# Patient Record
Sex: FEMALE | Race: WHITE | NOT HISPANIC OR LATINO | Employment: OTHER | URBAN - METROPOLITAN AREA
[De-identification: names, ages, dates, MRNs, and addresses within clinical notes are randomized per-mention and may not be internally consistent; named-entity substitution may affect disease eponyms.]

---

## 2017-11-07 DIAGNOSIS — Z01.812 PRE-OPERATIVE LABORATORY EXAMINATION: ICD-10-CM

## 2017-11-07 LAB
BASOPHILS # BLD AUTO: 0.6 % (ref 0–1.8)
BASOPHILS # BLD: 0.04 K/UL (ref 0–0.12)
EOSINOPHIL # BLD AUTO: 0.03 K/UL (ref 0–0.51)
EOSINOPHIL NFR BLD: 0.5 % (ref 0–6.9)
ERYTHROCYTE [DISTWIDTH] IN BLOOD BY AUTOMATED COUNT: 44.4 FL (ref 35.9–50)
HCT VFR BLD AUTO: 45.6 % (ref 37–47)
HGB BLD-MCNC: 15.4 G/DL (ref 12–16)
IMM GRANULOCYTES # BLD AUTO: 0.02 K/UL (ref 0–0.11)
IMM GRANULOCYTES NFR BLD AUTO: 0.3 % (ref 0–0.9)
LYMPHOCYTES # BLD AUTO: 1.54 K/UL (ref 1–4.8)
LYMPHOCYTES NFR BLD: 24.1 % (ref 22–41)
MCH RBC QN AUTO: 33 PG (ref 27–33)
MCHC RBC AUTO-ENTMCNC: 33.8 G/DL (ref 33.6–35)
MCV RBC AUTO: 97.9 FL (ref 81.4–97.8)
MONOCYTES # BLD AUTO: 0.43 K/UL (ref 0–0.85)
MONOCYTES NFR BLD AUTO: 6.7 % (ref 0–13.4)
NEUTROPHILS # BLD AUTO: 4.32 K/UL (ref 2–7.15)
NEUTROPHILS NFR BLD: 67.8 % (ref 44–72)
NRBC # BLD AUTO: 0 K/UL
NRBC BLD AUTO-RTO: 0 /100 WBC
PLATELET # BLD AUTO: 266 K/UL (ref 164–446)
PMV BLD AUTO: 10.4 FL (ref 9–12.9)
RBC # BLD AUTO: 4.66 M/UL (ref 4.2–5.4)
WBC # BLD AUTO: 6.4 K/UL (ref 4.8–10.8)

## 2017-11-07 PROCEDURE — 36415 COLL VENOUS BLD VENIPUNCTURE: CPT

## 2017-11-07 PROCEDURE — 85025 COMPLETE CBC W/AUTO DIFF WBC: CPT

## 2017-11-07 PROCEDURE — 80053 COMPREHEN METABOLIC PANEL: CPT

## 2017-11-07 RX ORDER — FLUOXETINE 10 MG/1
10 CAPSULE ORAL DAILY
COMMUNITY

## 2017-11-07 RX ORDER — SUMATRIPTAN 100 MG/1
100 TABLET, FILM COATED ORAL
COMMUNITY

## 2017-11-08 LAB
ALBUMIN SERPL BCP-MCNC: 4.7 G/DL (ref 3.2–4.9)
ALBUMIN/GLOB SERPL: 1.9 G/DL
ALP SERPL-CCNC: 84 U/L (ref 30–99)
ALT SERPL-CCNC: 18 U/L (ref 2–50)
ANION GAP SERPL CALC-SCNC: 13 MMOL/L (ref 0–11.9)
AST SERPL-CCNC: 22 U/L (ref 12–45)
BILIRUB SERPL-MCNC: 0.3 MG/DL (ref 0.1–1.5)
BUN SERPL-MCNC: 22 MG/DL (ref 8–22)
CALCIUM SERPL-MCNC: 9.7 MG/DL (ref 8.5–10.5)
CHLORIDE SERPL-SCNC: 105 MMOL/L (ref 96–112)
CO2 SERPL-SCNC: 23 MMOL/L (ref 20–33)
CREAT SERPL-MCNC: 0.67 MG/DL (ref 0.5–1.4)
GFR SERPL CREATININE-BSD FRML MDRD: >60 ML/MIN/1.73 M 2
GLOBULIN SER CALC-MCNC: 2.5 G/DL (ref 1.9–3.5)
GLUCOSE SERPL-MCNC: 84 MG/DL (ref 65–99)
POTASSIUM SERPL-SCNC: 3.8 MMOL/L (ref 3.6–5.5)
PROT SERPL-MCNC: 7.2 G/DL (ref 6–8.2)
SODIUM SERPL-SCNC: 141 MMOL/L (ref 135–145)

## 2017-11-20 ENCOUNTER — HOSPITAL ENCOUNTER (OUTPATIENT)
Facility: MEDICAL CENTER | Age: 67
End: 2017-11-20
Attending: SPECIALIST | Admitting: SPECIALIST
Payer: COMMERCIAL

## 2017-11-20 VITALS
OXYGEN SATURATION: 96 % | TEMPERATURE: 98.2 F | RESPIRATION RATE: 16 BRPM | SYSTOLIC BLOOD PRESSURE: 129 MMHG | HEART RATE: 92 BPM | DIASTOLIC BLOOD PRESSURE: 63 MMHG | WEIGHT: 153.88 LBS | BODY MASS INDEX: 27.27 KG/M2 | HEIGHT: 63 IN

## 2017-11-20 PROCEDURE — 700101 HCHG RX REV CODE 250

## 2017-11-20 PROCEDURE — 160002 HCHG RECOVERY MINUTES (STAT): Performed by: SPECIALIST

## 2017-11-20 PROCEDURE — A9270 NON-COVERED ITEM OR SERVICE: HCPCS

## 2017-11-20 PROCEDURE — 500368 HCHG DRAIN, 7MM FLAT-FLUTED: Performed by: SPECIALIST

## 2017-11-20 PROCEDURE — 160025 RECOVERY II MINUTES (STATS): Performed by: SPECIALIST

## 2017-11-20 PROCEDURE — 700102 HCHG RX REV CODE 250 W/ 637 OVERRIDE(OP)

## 2017-11-20 PROCEDURE — 501480 HCHG STOCKINETTE: Performed by: SPECIALIST

## 2017-11-20 PROCEDURE — 500440 HCHG DRESSING, STERILE ROLL (KERLIX): Performed by: SPECIALIST

## 2017-11-20 PROCEDURE — 160041 HCHG SURGERY MINUTES - EA ADDL 1 MIN LEVEL 4: Performed by: SPECIALIST

## 2017-11-20 PROCEDURE — A4311 CATHETER W/O BAG 2-WAY LATEX: HCPCS | Performed by: SPECIALIST

## 2017-11-20 PROCEDURE — 700111 HCHG RX REV CODE 636 W/ 250 OVERRIDE (IP)

## 2017-11-20 PROCEDURE — 500389 HCHG DRAIN, RESERVOIR SUCT JP 100CC: Performed by: SPECIALIST

## 2017-11-20 PROCEDURE — 160029 HCHG SURGERY MINUTES - 1ST 30 MINS LEVEL 4: Performed by: SPECIALIST

## 2017-11-20 PROCEDURE — 500754 HCHG JAW BRA: Performed by: SPECIALIST

## 2017-11-20 PROCEDURE — 302135 SEQUENTIAL COMPRESSION MACHINE: Performed by: SPECIALIST

## 2017-11-20 PROCEDURE — 501445 HCHG STAPLER, SKIN DISP: Performed by: SPECIALIST

## 2017-11-20 PROCEDURE — 160009 HCHG ANES TIME/MIN: Performed by: SPECIALIST

## 2017-11-20 PROCEDURE — 160046 HCHG PACU - 1ST 60 MINS PHASE II: Performed by: SPECIALIST

## 2017-11-20 PROCEDURE — 500126 HCHG BOVIE, NEEDLE TIP: Performed by: SPECIALIST

## 2017-11-20 PROCEDURE — A6410 STERILE EYE PAD: HCPCS | Performed by: SPECIALIST

## 2017-11-20 PROCEDURE — 160036 HCHG PACU - EA ADDL 30 MINS PHASE I: Performed by: SPECIALIST

## 2017-11-20 PROCEDURE — 160048 HCHG OR STATISTICAL LEVEL 1-5: Performed by: SPECIALIST

## 2017-11-20 PROCEDURE — 160035 HCHG PACU - 1ST 60 MINS PHASE I: Performed by: SPECIALIST

## 2017-11-20 PROCEDURE — 501838 HCHG SUTURE GENERAL: Performed by: SPECIALIST

## 2017-11-20 PROCEDURE — 160047 HCHG PACU  - EA ADDL 30 MINS PHASE II: Performed by: SPECIALIST

## 2017-11-20 RX ORDER — HALOPERIDOL 5 MG/ML
INJECTION INTRAMUSCULAR
Status: COMPLETED
Start: 2017-11-20 | End: 2017-11-20

## 2017-11-20 RX ORDER — SCOLOPAMINE TRANSDERMAL SYSTEM 1 MG/1
PATCH, EXTENDED RELEASE TRANSDERMAL
Status: COMPLETED
Start: 2017-11-20 | End: 2017-11-20

## 2017-11-20 RX ORDER — SODIUM CHLORIDE, SODIUM LACTATE, POTASSIUM CHLORIDE, CALCIUM CHLORIDE 600; 310; 30; 20 MG/100ML; MG/100ML; MG/100ML; MG/100ML
INJECTION, SOLUTION INTRAVENOUS
Status: ACTIVE | OUTPATIENT
Start: 2017-11-20 | End: 2017-11-20

## 2017-11-20 RX ORDER — BALANCED SALT SOLUTION 6.4; .75; .48; .3; 3.9; 1.7 MG/ML; MG/ML; MG/ML; MG/ML; MG/ML; MG/ML
SOLUTION OPHTHALMIC
Status: DISCONTINUED | OUTPATIENT
Start: 2017-11-20 | End: 2017-11-20 | Stop reason: HOSPADM

## 2017-11-20 RX ORDER — BACITRACIN ZINC 500 [USP'U]/G
OINTMENT TOPICAL
Status: DISCONTINUED | OUTPATIENT
Start: 2017-11-20 | End: 2017-11-20 | Stop reason: HOSPADM

## 2017-11-20 RX ORDER — LIDOCAINE HYDROCHLORIDE AND EPINEPHRINE 5; 5 MG/ML; UG/ML
INJECTION, SOLUTION INFILTRATION; PERINEURAL
Status: DISCONTINUED | OUTPATIENT
Start: 2017-11-20 | End: 2017-11-20 | Stop reason: HOSPADM

## 2017-11-20 RX ORDER — LIDOCAINE HYDROCHLORIDE AND EPINEPHRINE 10; 10 MG/ML; UG/ML
INJECTION, SOLUTION INFILTRATION; PERINEURAL
Status: DISCONTINUED | OUTPATIENT
Start: 2017-11-20 | End: 2017-11-20 | Stop reason: HOSPADM

## 2017-11-20 RX ORDER — SODIUM CHLORIDE, SODIUM LACTATE, POTASSIUM CHLORIDE, CALCIUM CHLORIDE 600; 310; 30; 20 MG/100ML; MG/100ML; MG/100ML; MG/100ML
1000 INJECTION, SOLUTION INTRAVENOUS
Status: DISCONTINUED | OUTPATIENT
Start: 2017-11-20 | End: 2017-11-20

## 2017-11-20 RX ORDER — EPINEPHRINE 1 MG/ML(1)
AMPUL (ML) INJECTION
Status: DISCONTINUED | OUTPATIENT
Start: 2017-11-20 | End: 2017-11-20 | Stop reason: HOSPADM

## 2017-11-20 RX ORDER — LIDOCAINE HYDROCHLORIDE 10 MG/ML
INJECTION, SOLUTION INFILTRATION; PERINEURAL
Status: DISCONTINUED | OUTPATIENT
Start: 2017-11-20 | End: 2017-11-20 | Stop reason: HOSPADM

## 2017-11-20 RX ADMIN — EPHEDRINE SULFATE 25 MG: 50 INJECTION INTRAMUSCULAR; INTRAVENOUS; SUBCUTANEOUS at 18:05

## 2017-11-20 RX ADMIN — SODIUM CHLORIDE, SODIUM LACTATE, POTASSIUM CHLORIDE, CALCIUM CHLORIDE 1000 ML: 600; 310; 30; 20 INJECTION, SOLUTION INTRAVENOUS at 09:30

## 2017-11-20 RX ADMIN — FENTANYL CITRATE 25 MCG: 50 INJECTION, SOLUTION INTRAMUSCULAR; INTRAVENOUS at 16:53

## 2017-11-20 RX ADMIN — HALOPERIDOL LACTATE 1 MG: 5 INJECTION, SOLUTION INTRAMUSCULAR at 16:26

## 2017-11-20 RX ADMIN — SCOPALAMINE 1 PATCH: 1 PATCH, EXTENDED RELEASE TRANSDERMAL at 09:30

## 2017-11-20 ASSESSMENT — PAIN SCALES - GENERAL
PAINLEVEL_OUTOF10: 4
PAINLEVEL_OUTOF10: ASSUMED PAIN PRESENT
PAINLEVEL_OUTOF10: 4
PAINLEVEL_OUTOF10: 0
PAINLEVEL_OUTOF10: 4
PAINLEVEL_OUTOF10: 0
PAINLEVEL_OUTOF10: 4
PAINLEVEL_OUTOF10: ASSUMED PAIN PRESENT
PAINLEVEL_OUTOF10: 0
PAINLEVEL_OUTOF10: 0

## 2017-11-20 NOTE — OR NURSING
1522 To PACU from OR via rney, side rails up x 2 for safety, lungs clear bilaterally, scds on patient and machine operational, dressing around head with ice packs to jaw bra and assessed bilateral eyes, steristrips CDI to lateral and medial eyes with minimal bruising or swelling, ice pack over NS soaked eye guaze pads. HOB elevated at 30 degrees. Moderate serosanguinous dilute drainage noted to posterior head dressing. Small, serosanguinous drainage to bilateral JAME drains. Mild rhythmic snoring noted. Pt does not respond to voice or touch.   1535 No changes  1550 Pt rousing with upper extremity movement. Pt able to nod yes and no; pt denies pain or nausea. Agrees to being comfortable.   1605 Pt arouses to voice, able to respond verbally in a few words, denies pain and nausea. Reports being sleepy. Denies any needs.   1620 Pt reports moderate nausea; sweaty from matthew hugger robe. RN removes robe while keeping patient covered in sheet for modesty; pt aware. Encouraged to DB/C; demonstrated understanding. Plan to give Haldol IV for nausea comfort.   1635 Pt reports nausea improved and tolerating ice chip. Denies pain. Eye pads removed and pt able to open eyes and reports vision as baseline. Minimal bruising and swelling. Eye pads and ice pack replaced over eyes.   1650 Reports pain of 5/10 to chin area, denies otherwise. Denies nausea; tolerating ice chips/sips. Instructed to DB/C; demonstrated understanding. Discussed oral PRN pain medication; pt declines oral medication due to hx of nausea, will eat at hotel and take medication then.   1705 Pt reports pain as improved to 4/10 and tolerable. Denies nausea.   1710 meets criteria for transfer to stage II.

## 2017-11-21 NOTE — OR NURSING
Patient's eyes bruised and swollen, she can see how many fingers I hold up. Patient and her  verbalize good understanding of discharge instructions. , Alessandro, demonstrates emptying JAME drains.  When she got up to wheelchair, patient began c/o nausea, no emesis. I spoke with Dr. Delgado and got an order for Ephedrine IM. She is presently sitting up in recliner,  at her side.  Ice compresses and eye cold pack to eyes.

## 2017-11-21 NOTE — DISCHARGE INSTRUCTIONS
ACTIVITY: Rest and take it easy for the first 24 hours.  A responsible adult is recommended to remain with you during that time.  It is normal to feel sleepy.  We encourage you to not do anything that requires balance, judgment or coordination.    MILD FLU-LIKE SYMPTOMS ARE NORMAL. YOU MAY EXPERIENCE GENERALIZED MUSCLE ACHES, THROAT IRRITATION, HEADACHE AND/OR SOME NAUSEA.    FOR 24 HOURS DO NOT:  Drive, operate machinery or run household appliances.  Drink beer or alcoholic beverages.   Make important decisions or sign legal documents.    SPECIAL INSTRUCTIONS:   Elevate head of bed at 30 degrees (prop up on several bed pillows when resting or sleeping)  Use incentive spirometer at home x 10 every hour while awake  Ice compresses on / off to upper cheeks x 48 hours  Jaw bra with ice x 72 hours  Ice normal saline compresses eyelids on/off x 48 hours  Refresh eye drops as needed while awake  Lacrilube as needed while asleep  Check and milk JAME drains every 4 hours; empty JAME drains every 12 hours or when half full.    Remove scopolamine patch after 72 hours; immediately wash skin then hands with soap and water    DIET: To avoid nausea, slowly advance diet as tolerated, avoiding spicy or greasy foods for the first day.  Add more substantial food to your diet according to your physician's instructions. INCREASE FLUIDS AND FIBER TO AVOID CONSTIPATION.    SURGICAL DRESSING/BATHING: Keep dressings clean, dry and intact; do not remove.    FOLLOW-UP APPOINTMENT:  A follow-up appointment should be arranged with your doctor in office as instructed; call to schedule.    You should CALL YOUR PHYSICIAN if you develop:  Fever greater than 101 degrees F.  Pain not relieved by medication, or persistent nausea or vomiting.  Excessive bleeding (blood soaking through dressing) or unexpected drainage from the wound.  Extreme redness or swelling around the incision site, drainage of pus or foul smelling drainage.  If unable to urinate or  empty your bladder within 8 hours; RETURN TO EMERGENCY ROOM.  Problems with breathing or chest pain.    You should call 911 if you develop problems with breathing or chest pain.  If you are unable to contact your doctor or surgical center, you should go to the nearest emergency room or urgent care center.  Dr Miranda's telephone #: [phone]    If any questions arise, call your doctor.  If your doctor is not available, please feel free to call the Surgical Center at [phone].  The Center is open Monday through Friday from 7AM to 7PM.  You can also call the HEALTH HOTLINE open 24 hours/day, 7 days/week and speak to a nurse at [phone], or toll free at [phone].    A registered nurse may call you a few days after your surgery to see how you are doing after your procedure.    MEDICATIONS: Resume taking daily medication.  Take prescribed pain medication with food.  If no medication is prescribed, you may take non-aspirin pain medication if needed.  PAIN MEDICATION CAN BE VERY CONSTIPATING.  Take a stool softener or laxative such as senokot, pericolace, or milk of magnesia if needed.    Prescription given for Home.  Last pain medication given at _______.  Start oral antibiotics at 7pm    If your physician has prescribed pain medication that includes Acetaminophen (Tylenol), do not take additional Acetaminophen (Tylenol) while taking the prescribed medication.    Depression / Suicide Risk    As you are discharged from this [facility] facility, it is important to learn how to keep safe from harming yourself.    Recognize the warning signs:  · Abrupt changes in personality, positive or negative- including increase in energy   · Giving away possessions  · Change in eating patterns- significant weight changes-  positive or negative  · Change in sleeping patterns- unable to sleep or sleeping all the time   · Unwillingness or inability to communicate  · Depression  · Unusual sadness, discouragement and  loneliness  · Talk of wanting to die  · Neglect of personal appearance   · Rebelliousness- reckless behavior  · Withdrawal from people/activities they love  · Confusion- inability to concentrate     If you or a loved one observes any of these behaviors or has concerns about self-harm, here's what you can do:  · Talk about it- your feelings and reasons for harming yourself  · Remove any means that you might use to hurt yourself (examples: pills, rope, extension cords, firearm)  · Get professional help from the community (Mental Health, Substance Abuse, psychological counseling)  · Do not be alone:Call your Safe Contact- someone whom you trust who will be there for you.  · Call your local CRISIS HOTLINE 953-3344 or 183-299-3308  · Call your local Children's Mobile Crisis Response Team [place] (755) 302-5836 or www.oboxo  · Call the toll free National Suicide Prevention Hotlines   · National Suicide Prevention Lifeline 300-940-TQRD (5409)  · National Hope Line Network 800-SUICIDE (328-9820)

## 2017-11-29 NOTE — OP REPORT
DATE OF SERVICE:  11/20/2017    SERVICE:  Plastic surgery.    SURGEON:  Masoud Miranda MD    ANESTHESIOLOGIST:  Tereso Delgado MD    ANESTHESIA:  General.    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    OPERATIVE PROCEDURE:  RefreshLift (modified facelift) with platysmaplasty,   liposculpture of the chin and neck, bilateral lower blepharoplasty and   bilateral endoscopic brow lifts.    INDICATIONS:  A 67-year-old female who is unhappy with the appearance of her   brows, lower eyelid, face and neck.  The patient has asymmetry of the lower   eyelids where the right has slightly more fat herniation and skin excess   compared to the left and where she has right worse than left brow ptosis.  It   was also noted that the right oral commissure is slightly lower than the left   and she has asymmetry of the face and neck.  The patient has deep set hollow   upper eyelids and therefore no upper blepharoplasty will be performed.  The   patient will undergo the procedure under general anesthesia as an outpatient.    She understands risks, benefits, and alternatives including but not limited   to the risks of bleeding, hematoma, seroma, infection, wound dehiscence,   painful or unsightly scarring, hypertrophic or keloid scarring, painful   neuroma, sensory loss decrease, injury to muscle, injury to ribs,   pneumothorax, DVT, pulmonary embolism, fat embolism, skin, fat and muscle   necrosis, injury to facial muscles and nerves, facial paralysis, paresis,   weakness or asymmetry, injury to parotid and other salivary glands and ducts,   parotitis, sialitis, dry mouth syndrome, injury to eyes and  eyelids,   blepharitis, conjunctivitis, upper eyelid lagophthalmos or ptosis, lower   eyelid retraction or ectropion, loss of lashes, dry eye syndrome, exposure   keratitis, corneal abrasion, chemosis, retrobulbar hematoma, dry eye syndrome,   blurred vision, visual compromise or blindness, asymmetry or irregularities   of the lower eyelids and upper eyelids, injury to neck structures, injury to   thyroid, too much or too little fat aspiration, cannula track marks,   irregularities, injury to brows and forehead, injury to supraorbital and   supratrochlear nerves and vessels, scar alopecia, malposition or asymmetry of   ears and ear lobes, malposition or asymmetry of frontal, temporal and   occipital hairlines, complications related to drains, persistent or recurrent   brow ptosis with asymmetry, lower eyelid dermatochalasis with asymmetry, aging   face and neck with platysmal bands and lipodystrophy of chin and neck,   infection, extrusion, palpability, visibility, loosening or rupture of   Supramid sutures, persistent animation and rhytides of the brows and forehead,   changes with weight gain or loss, changes with aging, medications, health   condition, trauma, infection, sun exposure, complications related to   anesthesia, complications related to drains, complications related to Bolivar   catheter, urethral stricture, cystitis, complications related to dressings,   cardiovascular or cardiorespiratory compromise, aspiration pneumonitis,   hemorrhage, need for transfusion, mortality, and need for future revision.    The patient is motivated and signed informed consent.    OPERATIVE REPORT:  The patient was marked preoperatively in the sitting   position.  The markings for the endoscopic brow lifts were marked as well as   the position of the brows where the right brow was slightly lower than left.    The lower eyelid blepharoplasty was marked and noted that the right is   slightly worse lower eyelid dermatochalasis  compared to left.  She was also   marked for the Refresh lift, platysmaplasty and lipodystrophy areas.  The   patient was taken to the OR where she was prepped and draped in supine   position under general anesthesia.  Sequential stockings placed as well as   Bolivar catheter.  Please note Bolivar catheter will be removed before the end of   procedure.  Starting with the right lower eyelid, it was marked and then   infiltrated with 1% Xylocaine with 1:100,000 dilution epinephrine.  A   subciliary incision was made below lash line and carried into lateral orbital   crease.  The incision was made through skin and subcutaneous tissue leaving at   least a 5 mm strip of pretarsal orbicularis oculi muscle intact.  A skin   muscle flap was dissected inferiorly towards the inferior orbital rim and then   small incisions were made in the medial, middle and lateral orbital septum   gaining access to these compartments where appropriate amount of orbital fat   was excised using needle tip electrocautery.  Some fat was repositioned   medially to prevent nasojugal tear trough deformity.  Hemostasis secured,   wound irrigated, and then the superior lateral aspect of the right lower   eyelid orbicularis oculi muscle was suspended and tacked down to the right   lateral periorbital periosteum with interrupted buried 5-0 Monocryl suture.    After excising about 2-3 mm of the lateral 3rd and 1-2 mm of medial 2/3rd of   right lower eyelid skin, the lateral extent of right lower eyelid incision was   closed using interrupted 6-0 Prolene suture followed by running subcuticular   5-0 Prolene suture to close the subciliary incision.  Significant improvement   was noted on the right lower eyelid and the right eye was irrigated with BSS,   well lubricated and ice cold saline compress applied.  Similar procedure   performed on the left lower eyelid with similar marking, infiltration,   incision, and skin resection, but slightly less fat was  resected as it was   worse on the right.  On the left side, hemostasis secured, wound irrigated,   and some fat repositioned medially to prevent nasojugal tear trough deformity.    The superior lateral aspect of the left lower eyelid orbicularis oculi   muscle was then suspended and tacked down to the left lateral periorbital   periosteum with interrupted buried 5-0 Monocryl suture and then closure with   interrupted 6-0 Prolene suture to close the lateral extent of the left lower   eyelid incision followed by running subcuticular 5-0 Prolene suture to close   the subciliary incision.  The left eye was irrigated with BSS, well lubricated   and ice cold saline compress applied.  Highly satisfactory symmetry and   improvement.  The medial and lateral loose ends of 5-0 Prolene sutures were   secured to the proximal nasal dorsum and bilateral lateral periorbital skin   respectively.  Attention was then focused to the chin and neck area where a 3   mm stab incision was made in the mid submental crease and a total of 200 mL of   tumescent solution was instilled into the marked chin and neck from the chin   down to thyroid cartilage and from jawline down to lower neck and from earlobe   to earlobe.  Liposculpture was performed with a 3 mm nonpowered cannula   aspirating 60 mL of fat and fluid.  A 2.5 cm transverse submental incision was   made through the submental crease including the stab incision with a 15 blade   through skin and subcutaneous tissue and then dissection performed    the skin and subcutaneous fat from the underlying platysmal muscle from the   chin down to the thyroid cartilage and then about 8 cm to the right and 8 cm   to the left from jawline down to lower neck.  Hemostasis secured using bipolar   electrocautery and the wound irrigated with sterile saline.  At this point, a   2-layer corset platysmaplasty was performed after transecting the platysmal   bands on each side for about 4-5 cm to  the right and to the left as well as   1-2 cm in the submental area.  The platysmal bands were reapproximated down   the midline starting at the submental incision, reapproximating the platysmal   bands at the midline using running buried 5-0 PDS suture all the way down to   the thyroid cartilage and then as a second pass cephalad reapproximating the   platysmal bands in another layer and tying the suture with multiple buried   knots in the submental crease area.  Hemostasis secured and wound irrigated.    Significant improvement noted.  The face was turned to the right where the   left temporal scalp, upper, mid and lower cheek as well as anterolateral neck,   postauricular and mastoid skin were infiltrated with 0.25% Xylocaine with   1:400,000 dilution epinephrine.  A vertical incision was made in the temporal   scalp on the left above the ear and carried as a preauricular and retrotragal   incision as well as along the postauricular skin and then a few millimeters   posterior to the occipital hairline.  The incision was made through skin and   subcutaneous tissue and dissection was carried anteriorly about care home the   distance from the preauricular incision to the nasolabial crease.  Dissection   was also carried into the anterolateral neck area communicating with the   platysmaplasty dissection and then elevating the postauricular and mastoid   skin using a 15 blade and facelift scissors.  Hemostasis secured using bipolar   electrocautery and wound irrigated with sterile saline.  At this point, a   running buried 2-0 Ethibond pursestring suture was placed starting about 1 cm   anterior from preauricular incision and at the level of the left proximal   zygomatic arch, weaving it in the SMAS inferiorly for a few centimeters below   the jawline and then purchasing the platysmal muscle anteriorly for a few   centimeters and then weaving it in the SMAS superiorly as a loop up to the   zygomatic arch where it was  tacked down to the left proximal zygomatic arch   periosteum and cheek fascia and tied with multiple buried knots under   appropriate tension suspending the left platysma and plicating the left cheek   SMAS.  A number of interrupted buried cephaloposterior vector 3-0 Supramid   sutures were placed to suspend the left malar fat pad, and plicate the left   SMAS in the cheek as well as plicating the cervical fascia to the platysma in   the neck.  Between these, a number of interrupted buried 4-0 Monocryl quilting   sutures were placed.  Hemostasis secured using bipolar electrocautery and   wound irrigated with sterile saline.  At this point, appropriate amount of   left hair-bearing temporal scalp, preauricular and postauricular skin was   resected and under appropriate tension, closure was performed using   interrupted buried dermal 4-0 Monocryl sutures followed by surgical staples in   the hair bearing scalp, running 6-0 Prolene to close the preauricular and   retrotragal incision and running 4-0 nylon to close the postauricular   incision.  Through the submental incision, a trimmed 7 mm flat fluted Bubba   drain was placed and brought out through the posterior aspect of the left   postauricular incision where it was sutured in place using interrupted 4-0   nylon drain suture.  Highly satisfactory improvement was noted on the left   side with excellent capillary refill and perfusion.  The face was turned to   the left and ice cold compresses applied to the right side.  The exact same   procedure was performed on the left side with similar marking, infiltration,   incisions, dissection, 2-0 Supramid pursestring suture, as well as a number of   interrupted buried cephaloposterior vector 3-0 Supramid malar fat pad   suspension, SMAS plication, and cervical fascia to platysma plication sutures.    Between these, a number of interrupted buried 4-0 Monocryl quilting sutures   were placed.  Please note that the vector was  slightly more cephalic on the   right side since the oral commissure was slightly lower and this achieve   better symmetry.  Hemostasis secured using bipolar electrocautery and wound   irrigated with sterile saline.  Appropriate amount of right temporal scalp,   preauricular and postauricular skin were resected and closure under   appropriate tension was performed using interrupted buried dermal 4-0 Monocryl   sutures followed by surgical staples in the right temporal scalp, running 6-0   Prolene to close the preauricular and retrotragal incision and running 4-0   nylon to close the postauricular incision.  Similarly, a trimmed 7 mm flat   fluted Bubba drain was placed through the submental incision and brought out   through the right postauricular incision where it was sutured in place using   interrupted 4-0 nylon drain suture.  Highly satisfactory symmetry and   improvement was noted.  Ice cold compresses applied to the right face and neck   and the face placed in neutral position where the submental incision was   closed using interrupted buried dermal 5-0 Monocryl sutures followed by   running 5-0 Prolene in skin.  Benzoin and Steri-Strips applied.  The forehead   and brow areas as well as frontal hairline were then infiltrated with 0.5%   Xylocaine with 1:200,000 dilution epinephrine.  A 2 cm vertical incision was   made in the mid frontal scalp just behind the frontal hairline with a 15 blade   through scalp and subcutaneous tissue as well as through the frontalis, galea   and periosteum.  Subperiosteal dissection was then carried down inferiorly   about 2.5 cm above the brow area and to the right and to the left.  This also   carried posteriorly into the frontal scalp in order to elevate the   supraorbital and especially the lateral branch of the supraorbital nerves on   each side with the periosteum.  At this point, the right and then the left   obliquely oriented elliptical incisions were made about 3.5x2  cm on the right   and on the left about 2.5x1.5 cm as the right brow was lower than the left.    The elliptical incisions were made through skin and subcutaneous tissue and   then the skin and subcutaneous tissue were dissected off of the underlying   structures, protecting the supraorbital nerves and vessels on each side.    Hemostasis secured using bipolar electrocautery and wound irrigated with   sterile saline.  At this point, vertical dissection was made through the   frontalis muscle and galea as well as through the periosteum and subperiosteal   dissection was performed with the use of the endoscope and elevators.  The   bilateral temporal crest ligaments were released from superior to inferior and   then subperiosteal dissection was carried down to the radix of the nose and   to the superior orbital rim on each side.  Dissection was then carried   laterally to the medial temporal zygomatic sentinel veins on each side and the   lateral periorbital ligaments on each side were released using long Kumar   tenotomy scissors under direct endoscopic vision.  Once this was done on both   sides from medial to lateral, the periosteum and galea were released,   identifying and protecting the bilateral supraorbital nerves and vessels as   well as supratrochlear nerves and vessels.  Once this was achieved, the   depressor supercilii,  muscles and parts of the orbicularis oculi   muscles were stripped in order to diminish animation and depression of the   brows on both sides.  Finally, some radial myotomies were performed on the   lateral third of the orbicularis oculi muscles, identifying and protecting the   neurovascular structures.  Once this was achieved, attempts to transect the   motor branches to the  muscles was also performed.  Hemostasis   secured using electrocautery and wound irrigated with sterile saline.  At this   point, the brows were elevated about 1 cm on the right and 6 mm on the  left,   achieving excellent symmetry and improvement.  This was done by   reapproximating the periosteum and other deeper tissues on each side of the   paramedian incisions with interrupted buried 4-0 Monocryl sutures, identifying   and protecting the supraorbital nerves and vessels.  Once a number of these   were placed and the brow lifts were stabilized, the deep subcutaneous fat was   closed using interrupted buried 5-0 Monocryl sutures and then the skin was   closed using running 6-0 Prolene in the skin on both sides on the paramedian   incisions.  The midline incision was closed using interrupted buried dermal   5-0 Monocryl suture followed by surgical staples.  Highly satisfactory   symmetry and improvement of the brows was noted.  No drain required as   excellent hemostasis was secured.  Bacitracin ointment was applied to the pre   and postauricular suture lines as well as the drain sites.  The forehead was   dressed with 4x4 gauze, secured snug but not too tightly with sterile 4-inch   Coban wrap.  4x4 gauzes were applied to the pre and postauricular suture lines   as well as the cheek, chin and neck, secured snug but not too tightly with   sterile 4-inch Kerlix followed by 3-inch Ace wraps and a jaw bra was applied.    Surginet was applied to secure the ice cold saline compresses on the eyelids.    Drains had minimal to moderate output.  The patient had 125 mL of blood   loss.  No complications.  All counts correct at the end of procedure.  She was   awakened from anesthesia, extubated and returned to recovery room in stable   condition.       ____________________________________     MD WES HARMON / N[facility]    DD:  11/29/2017 12:27:30  DT:  11/29/2017 13:13:05    D#:  [id number]  Job#:  [id number]

## 2018-11-06 ENCOUNTER — APPOINTMENT (RX ONLY)
Dept: URBAN - METROPOLITAN AREA CLINIC 52 | Facility: CLINIC | Age: 68
Setting detail: DERMATOLOGY
End: 2018-11-06

## 2018-11-06 DIAGNOSIS — Z71.89 OTHER SPECIFIED COUNSELING: ICD-10-CM

## 2018-11-06 DIAGNOSIS — L81.4 OTHER MELANIN HYPERPIGMENTATION: ICD-10-CM

## 2018-11-06 DIAGNOSIS — L85.3 XEROSIS CUTIS: ICD-10-CM

## 2018-11-06 DIAGNOSIS — L57.0 ACTINIC KERATOSIS: ICD-10-CM

## 2018-11-06 DIAGNOSIS — D485 NEOPLASM OF UNCERTAIN BEHAVIOR OF SKIN: ICD-10-CM

## 2018-11-06 PROBLEM — D48.5 NEOPLASM OF UNCERTAIN BEHAVIOR OF SKIN: Status: ACTIVE | Noted: 2018-11-06

## 2018-11-06 PROBLEM — F32.9 MAJOR DEPRESSIVE DISORDER, SINGLE EPISODE, UNSPECIFIED: Status: ACTIVE | Noted: 2018-11-06

## 2018-11-06 PROCEDURE — ? TREATMENT REGIMEN

## 2018-11-06 PROCEDURE — ? COUNSELING

## 2018-11-06 PROCEDURE — 11100: CPT | Mod: 59

## 2018-11-06 PROCEDURE — 17000 DESTRUCT PREMALG LESION: CPT

## 2018-11-06 PROCEDURE — ? LIQUID NITROGEN

## 2018-11-06 PROCEDURE — 99203 OFFICE O/P NEW LOW 30 MIN: CPT | Mod: 25

## 2018-11-06 PROCEDURE — ? BIOPSY BY SHAVE METHOD

## 2018-11-06 ASSESSMENT — LOCATION ZONE DERM
LOCATION ZONE: FACE
LOCATION ZONE: ARM
LOCATION ZONE: FACE
LOCATION ZONE: LEG
LOCATION ZONE: TRUNK

## 2018-11-06 ASSESSMENT — LOCATION SIMPLE DESCRIPTION DERM
LOCATION SIMPLE: RIGHT CHEEK
LOCATION SIMPLE: LEFT THIGH
LOCATION SIMPLE: LEFT UPPER ARM
LOCATION SIMPLE: RIGHT THIGH
LOCATION SIMPLE: LEFT FOREARM
LOCATION SIMPLE: RIGHT CHEEK
LOCATION SIMPLE: RIGHT UPPER ARM
LOCATION SIMPLE: INFERIOR FOREHEAD
LOCATION SIMPLE: CHEST

## 2018-11-06 ASSESSMENT — LOCATION DETAILED DESCRIPTION DERM
LOCATION DETAILED: RIGHT SUPERIOR LATERAL BUCCAL CHEEK
LOCATION DETAILED: LEFT ANTERIOR DISTAL UPPER ARM
LOCATION DETAILED: LEFT PROXIMAL DORSAL FOREARM
LOCATION DETAILED: UPPER STERNUM
LOCATION DETAILED: RIGHT ANTERIOR DISTAL THIGH
LOCATION DETAILED: RIGHT ANTERIOR DISTAL UPPER ARM
LOCATION DETAILED: LEFT ANTERIOR DISTAL THIGH
LOCATION DETAILED: INFERIOR MID FOREHEAD
LOCATION DETAILED: RIGHT SUPERIOR LATERAL BUCCAL CHEEK

## 2018-11-06 ASSESSMENT — TOTAL NUMBER OF LESIONS: # OF LESIONS?: 1

## 2018-11-06 ASSESSMENT — PAIN INTENSITY VAS: HOW INTENSE IS YOUR PAIN 0 BEING NO PAIN, 10 BEING THE MOST SEVERE PAIN POSSIBLE?: NO PAIN

## 2018-11-06 NOTE — HPI: SKIN LESION
What Type Of Note Output Would You Prefer (Optional)?: Bullet Format
How Severe Is Your Skin Lesion?: mild
Has Your Skin Lesion Been Treated?: not been treated
Is This A New Presentation, Or A Follow-Up?: Mole
Is This A New Presentation, Or A Follow-Up?: Skin Lesion

## 2018-11-06 NOTE — PROCEDURE: LIQUID NITROGEN
Post-Care Instructions: I reviewed with the patient in detail post-care instructions. Patient is to wear sunprotection, and avoid picking at any of the treated lesions. Pt may apply Vaseline to crusted or scabbing areas.
Render Post-Care Instructions In Note?: no
Number Of Freeze-Thaw Cycles: 1 freeze-thaw cycle
Detail Level: Simple
Duration Of Freeze Thaw-Cycle (Seconds): 5
Consent: The patient's consent was obtained including but not limited to risks of crusting, scabbing, blistering, scarring, darker or lighter pigmentary change, recurrence, incomplete removal and infection.

## 2018-11-06 NOTE — PROCEDURE: BIOPSY BY SHAVE METHOD
Biopsy Method: Personna blade
Biopsy Type: H and E
Consent: Written consent was obtained and risks were reviewed including but not limited to scarring, infection, bleeding, scabbing, incomplete removal, nerve damage and allergy to anesthesia.
Cryotherapy Text: The wound bed was treated with cryotherapy after the biopsy was performed.
Bill 82538 For Specimen Handling/Conveyance To Laboratory?: no
Size Of Lesion In Cm: 0.6
Silver Nitrate Text: The wound bed was treated with silver nitrate after the biopsy was performed.
Hemostasis: Drysol
Was A Bandage Applied: Yes
Post-Care Instructions: I reviewed with the patient in detail post-care instructions. Patient is to keep the biopsy site dry overnight, and then apply bacitracin twice daily until healed. Patient may apply hydrogen peroxide soaks to remove any crusting.
Lab: [id number]
Lab Facility: [address] [place] [address]
Anesthesia Type: 1% lidocaine with epinephrine
Electrodesiccation Text: The wound bed was treated with electrodesiccation after the biopsy was performed.
Depth Of Biopsy: dermis
Type Of Destruction Used: Curettage
Additional Anesthesia Volume In Cc (Will Not Render If 0): 0
Path Notes (To The Dermatopathologist): Size: 0.6 cm x 0.6 cm Rule out: DN
Anesthesia Volume In Cc (Will Not Render If 0): 0.5
Dressing: bandage
Body Location Override (Optional - Billing Will Still Be Based On Selected Body Map Location If Applicable): Left superior forearm
Billing Type: [employer]
Electrodesiccation And Curettage Text: The wound bed was treated with electrodesiccation and curettage after the biopsy was performed.
Notification Instructions: Patient will be notified of biopsy results. However, patient instructed to call the office if not contacted within 2 weeks.
Detail Level: Detailed
Wound Care: Bacitracin
Curettage Text: The wound bed was treated with curettage after the biopsy was performed.

## 2018-11-06 NOTE — PROCEDURE: MIPS QUALITY
Quality 131: Pain Assessment And Follow-Up: Pain assessment NOT documented as being performed, documentation the patient is not eligible for a pain assessment using a standardized tool
Detail Level: Detailed
Quality 130: Documentation Of Current Medications In The Medical Record: Current Medications Documented
Quality 226: Preventive Care And Screening: Tobacco Use: Screening And Cessation Intervention: Patient screened for tobacco use and is an ex/non-smoker
Quality 110: Preventive Care And Screening: Influenza Immunization: Influenza Immunization Administered during Influenza season
Quality 431: Preventive Care And Screening: Unhealthy Alcohol Use - Screening: Patient screened for unhealthy alcohol use using a single question and scores less than 2 times per year

## (undated) DEVICE — TRAY SRGPRP PVP IOD WT PRP - (20/CA)

## (undated) DEVICE — ELECTRODE DUAL RETURN W/ CORD - (50/PK)

## (undated) DEVICE — STYLETTE 6FR INFANT STERILE SINGEL ALUMINUM SUNSLIP SEALED IN PVC SHEATH (20EA/BX)

## (undated) DEVICE — BAG, SPONGE COUNT 50600

## (undated) DEVICE — DRAIN J-VAC 7MM FLAT - (10EA/CA)

## (undated) DEVICE — GARMENT THRP GENIOPLASTY JAW - (#95 BLUE)

## (undated) DEVICE — SUTURE 3-0 SUPRAMID - (12/BX)

## (undated) DEVICE — KIT ROOM DECONTAMINATION

## (undated) DEVICE — GLOVE BIOGEL SZ 8 SURGICAL PF LTX - (50PR/BX 4BX/CA)

## (undated) DEVICE — SPONGE GAUZESTER 4 X 4 4PLY - (128PK/CA)

## (undated) DEVICE — SUCTION CANNISTER, LIPO 2000ML

## (undated) DEVICE — TUBE E-T HI-LO CUFF 7.5MM (10EA/PK)

## (undated) DEVICE — SPONGE XRAY 8X4 STERL. 12PL - (10EA/TY 80TY/CA)

## (undated) DEVICE — SUTURE 4-0 MONOCRYL PLUS PS-2 - 27 INCH (36/BX)

## (undated) DEVICE — GLOVE BIOGEL PI ULTRATOUCH SZ 8.5 SURGICAL PF LF - (200PR/CA)

## (undated) DEVICE — DRAPE SURGICAL U 77X120 - (10/CA)

## (undated) DEVICE — SUTURE 5-0 MONOCRYL PLUS P-3 - 18 INCH (12/BX)

## (undated) DEVICE — PAD LAP STERILE 18 X 18 - (5/PK 40PK/CA)

## (undated) DEVICE — MASK ANESTHESIA ADULT  - (100/CA)

## (undated) DEVICE — GLOVE BIOGEL PI INDICATOR SZ 8.0 SURGICAL PF LF -(50/BX 4BX/CA)

## (undated) DEVICE — SUTURE 6-0 PROLENE P-3 - (12/BX)

## (undated) DEVICE — GLOVE BIOGEL SZ 7.5 SURGICAL PF LTX - (50PR/BX 4BX/CA)

## (undated) DEVICE — STAPLER SKIN DISP - (6/BX 10BX/CA) VISISTAT

## (undated) DEVICE — TUBING TUMESENCE - 10/BX

## (undated) DEVICE — NEEDLE SPINAL NON-SAFETY 22 GA X 3 (25EA/BX)"

## (undated) DEVICE — RESERVOIR SUCTION 100 CC - SILICONE (20EA/CA)

## (undated) DEVICE — KIT ANESTHESIA W/CIRCUIT & 3/LT BAG W/FILTER (20EA/CA)

## (undated) DEVICE — BLADE SURGICAL #15 - (50/BX 3BX/CA)

## (undated) DEVICE — BANDAGE ROLL STERILE BULKEE 4.5 IN X 4 YD (100EA/CA)

## (undated) DEVICE — TUBING PAL ASPIRATION LIPOSUC

## (undated) DEVICE — SUTURE 5-0 PROLENE P-3 - (12/BX)

## (undated) DEVICE — GOWN SURGEONS X-LARGE - DISP. (30/CA)

## (undated) DEVICE — TRAY BLADDER CARE W/ 16 FR FOLEY CATHETER STATLOCK  (10/CA)

## (undated) DEVICE — PAD EYE GAUZE COVERED OVAL 1 5/8 X 2 5/8" STERILE"

## (undated) DEVICE — TOWELS CLOTH SURGICAL - (4/PK 20PK/CA)

## (undated) DEVICE — TUBE CONNECTING SUCTION - CLEAR PLASTIC STERILE 72 IN (50EA/CA)

## (undated) DEVICE — SENSOR SPO2 NEO LNCS ADHESIVE (20/BX) SEE USER NOTES

## (undated) DEVICE — SUT NABSB 2-0 J-1 36IN SPRMD E - ---201G---24/BX

## (undated) DEVICE — SUTURE GENERAL

## (undated) DEVICE — DRAPE MAYO STAND - (30/CA)

## (undated) DEVICE — CORDS BIPOLAR COAGULATION - 12FT STERILE DISP. (10EA/BX)

## (undated) DEVICE — GLOVE SURGICAL PROTEXIS PI 8.0 LF - (50PR/BX)

## (undated) DEVICE — BOVIE NEEDLE TIP INSULATD NON-SAFETY 2CM (50/PK)

## (undated) DEVICE — NEPTUNE 4 PORT MANIFOLD - (20/PK)

## (undated) DEVICE — SUTURE 4-0 ETHILON FS-2 18 (36PK/BX)"

## (undated) DEVICE — NEEDLE NON SAFETY 27GA X 1-1/4 IN HYPO (100EA/BX)

## (undated) DEVICE — BANDAGE TENSOPLAST 3 X 5 YDS - (1/EA)

## (undated) DEVICE — HEAD HOLDER JUNIOR/ADULT

## (undated) DEVICE — PROTECTOR ULNA NERVE - (36PR/CA)

## (undated) DEVICE — SODIUM CHL IRRIGATION 0.9% 1000ML (12EA/CA)

## (undated) DEVICE — STOCKINET TUBULAR 3IN STERILE - 3 X 36 YDS (25/CA)

## (undated) DEVICE — HUMID-VENT HEAT AND MOISTURE EXCHANGE- (50/BX)

## (undated) DEVICE — WATER IRRIGATION STERILE 1000ML (12EA/CA)

## (undated) DEVICE — GLOVE 7.0 LF PF PROTEXIS (50PR/BX)

## (undated) DEVICE — SUTURE 5-0 PROL PS-3 (12PK/BX)

## (undated) DEVICE — CANISTER SUCTION RIGID RED 1500CC (40EA/CA)

## (undated) DEVICE — LACTATED RINGERS INJ 1000 ML - (14EA/CA 60CA/PF)

## (undated) DEVICE — ELECTRODE 850 FOAM ADHESIVE - HYDROGEL RADIOTRNSPRNT (50/PK)

## (undated) DEVICE — GLOVE, LITE (PAIR)

## (undated) DEVICE — SYRINGE 10 ML CONTROL LL (25EA/BX 4BX/CA)

## (undated) DEVICE — SUTURE PDS 5-0 P3 18IN - (12/BX)

## (undated) DEVICE — SUTURE 4-0 ETHILON FS-1 18 (36PK/BX)"

## (undated) DEVICE — BANDAGE ELASTIC 3 X 5 YDS - STERILE VELCRO (25/CA)LATEX

## (undated) DEVICE — SUCTION INSTRUMENT YANKAUER BULBOUS TIP W/O VENT (50EA/CA)

## (undated) DEVICE — BAG DECANTER (50EA/CS)

## (undated) DEVICE — GOWN WARMING STANDARD FLEX - (30/CA)

## (undated) DEVICE — PACK MINOR BASIN - (2EA/CA)

## (undated) DEVICE — DRAPE LARGE 3 QUARTER - (20/CA)